# Patient Record
Sex: FEMALE | Race: WHITE | Employment: PART TIME | URBAN - METROPOLITAN AREA
[De-identification: names, ages, dates, MRNs, and addresses within clinical notes are randomized per-mention and may not be internally consistent; named-entity substitution may affect disease eponyms.]

---

## 2020-08-14 ENCOUNTER — HOSPITAL ENCOUNTER (OUTPATIENT)
Dept: PREADMISSION TESTING | Age: 70
Discharge: HOME | End: 2020-08-14
Payer: MEDICARE

## 2020-08-14 LAB
HCT VFR BLD AUTO: 33.5 % (ref 35–45)
HGB BLD-MCNC: 10.7 G/DL (ref 12–16)
POTASSIUM SERPL-SCNC: 5.4 MMOL/L (ref 3.5–5.5)

## 2020-08-14 PROCEDURE — 84132 ASSAY OF SERUM POTASSIUM: CPT

## 2020-08-14 PROCEDURE — 93005 ELECTROCARDIOGRAM TRACING: CPT

## 2020-08-14 PROCEDURE — 36415 COLL VENOUS BLD VENIPUNCTURE: CPT

## 2020-08-14 PROCEDURE — 85018 HEMOGLOBIN: CPT

## 2020-08-16 LAB
ATRIAL RATE: 52 BPM
CALCULATED P AXIS, ECG09: 63 DEGREES
CALCULATED R AXIS, ECG10: 38 DEGREES
CALCULATED T AXIS, ECG11: 54 DEGREES
DIAGNOSIS, 93000: NORMAL
P-R INTERVAL, ECG05: 178 MS
Q-T INTERVAL, ECG07: 462 MS
QRS DURATION, ECG06: 86 MS
QTC CALCULATION (BEZET), ECG08: 429 MS
VENTRICULAR RATE, ECG03: 52 BPM
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
## 2022-08-09 ENCOUNTER — OFFICE VISIT (OUTPATIENT)
Dept: NEUROLOGY | Age: 72
End: 2022-08-09
Payer: MEDICARE

## 2022-08-09 VITALS
RESPIRATION RATE: 16 BRPM | SYSTOLIC BLOOD PRESSURE: 140 MMHG | HEIGHT: 64 IN | BODY MASS INDEX: 31.41 KG/M2 | DIASTOLIC BLOOD PRESSURE: 80 MMHG | OXYGEN SATURATION: 98 % | WEIGHT: 184 LBS | TEMPERATURE: 97.3 F | HEART RATE: 100 BPM

## 2022-08-09 DIAGNOSIS — G31.84 MCI (MILD COGNITIVE IMPAIRMENT): ICD-10-CM

## 2022-08-09 DIAGNOSIS — S06.9X9S TRAUMATIC BRAIN INJURY WITH LOSS OF CONSCIOUSNESS, SEQUELA (HCC): Primary | ICD-10-CM

## 2022-08-09 DIAGNOSIS — I65.23 BILATERAL CAROTID ARTERY STENOSIS: ICD-10-CM

## 2022-08-09 PROCEDURE — 1123F ACP DISCUSS/DSCN MKR DOCD: CPT | Performed by: PSYCHIATRY & NEUROLOGY

## 2022-08-09 PROCEDURE — G8536 NO DOC ELDER MAL SCRN: HCPCS | Performed by: PSYCHIATRY & NEUROLOGY

## 2022-08-09 PROCEDURE — 3017F COLORECTAL CA SCREEN DOC REV: CPT | Performed by: PSYCHIATRY & NEUROLOGY

## 2022-08-09 PROCEDURE — 1090F PRES/ABSN URINE INCON ASSESS: CPT | Performed by: PSYCHIATRY & NEUROLOGY

## 2022-08-09 PROCEDURE — G8427 DOCREV CUR MEDS BY ELIG CLIN: HCPCS | Performed by: PSYCHIATRY & NEUROLOGY

## 2022-08-09 PROCEDURE — G8510 SCR DEP NEG, NO PLAN REQD: HCPCS | Performed by: PSYCHIATRY & NEUROLOGY

## 2022-08-09 PROCEDURE — 1101F PT FALLS ASSESS-DOCD LE1/YR: CPT | Performed by: PSYCHIATRY & NEUROLOGY

## 2022-08-09 PROCEDURE — 99205 OFFICE O/P NEW HI 60 MIN: CPT | Performed by: PSYCHIATRY & NEUROLOGY

## 2022-08-09 PROCEDURE — G8417 CALC BMI ABV UP PARAM F/U: HCPCS | Performed by: PSYCHIATRY & NEUROLOGY

## 2022-08-09 PROCEDURE — G8400 PT W/DXA NO RESULTS DOC: HCPCS | Performed by: PSYCHIATRY & NEUROLOGY

## 2022-08-09 RX ORDER — LEVOTHYROXINE SODIUM 88 UG/1
88 TABLET ORAL DAILY
COMMUNITY
Start: 2022-05-09

## 2022-08-09 RX ORDER — AMLODIPINE BESYLATE 2.5 MG/1
2.5 TABLET ORAL DAILY
COMMUNITY
Start: 2022-07-11

## 2022-08-09 RX ORDER — ALBUTEROL SULFATE 90 UG/1
AEROSOL, METERED RESPIRATORY (INHALATION)
COMMUNITY
Start: 2022-07-15

## 2022-08-09 RX ORDER — PANTOPRAZOLE SODIUM 40 MG/1
TABLET, DELAYED RELEASE ORAL
COMMUNITY
Start: 2022-06-30

## 2022-08-09 RX ORDER — ATORVASTATIN CALCIUM 20 MG/1
20 TABLET, FILM COATED ORAL DAILY
COMMUNITY
Start: 2022-06-29

## 2022-08-09 RX ORDER — LOSARTAN POTASSIUM 50 MG/1
50 TABLET ORAL DAILY
COMMUNITY
Start: 2022-07-03

## 2022-08-09 RX ORDER — CITALOPRAM 40 MG/1
40 TABLET, FILM COATED ORAL DAILY
COMMUNITY
Start: 2022-06-29

## 2022-08-09 RX ORDER — AMITRIPTYLINE HYDROCHLORIDE 10 MG/1
TABLET, FILM COATED ORAL
COMMUNITY
Start: 2022-06-30

## 2022-08-09 RX ORDER — BUTALBITAL, ACETAMINOPHEN AND CAFFEINE 300; 40; 50 MG/1; MG/1; MG/1
CAPSULE ORAL
COMMUNITY
Start: 2022-07-12
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
## 2022-09-01 ENCOUNTER — OFFICE VISIT (OUTPATIENT)
Dept: NEUROLOGY | Age: 72
End: 2022-09-01
Payer: MEDICARE

## 2022-09-01 DIAGNOSIS — F33.41 RECURRENT MAJOR DEPRESSIVE DISORDER, IN PARTIAL REMISSION (HCC): Primary | ICD-10-CM

## 2022-09-01 PROCEDURE — 1123F ACP DISCUSS/DSCN MKR DOCD: CPT | Performed by: CLINICAL NEUROPSYCHOLOGIST

## 2022-09-01 PROCEDURE — 90791 PSYCH DIAGNOSTIC EVALUATION: CPT | Performed by: CLINICAL NEUROPSYCHOLOGIST
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
## 2022-09-15 ENCOUNTER — OFFICE VISIT (OUTPATIENT)
Dept: NEUROLOGY | Age: 72
End: 2022-09-15
Payer: MEDICARE

## 2022-09-15 DIAGNOSIS — F33.41 MAJOR DEPRESSIVE DISORDER, RECURRENT EPISODE, IN PARTIAL REMISSION WITH ANXIOUS DISTRESS (HCC): ICD-10-CM

## 2022-09-15 DIAGNOSIS — S06.9X9S TRAUMATIC BRAIN INJURY WITH LOSS OF CONSCIOUSNESS, SEQUELA (HCC): Primary | ICD-10-CM

## 2022-09-15 DIAGNOSIS — G31.84 MILD COGNITIVE IMPAIRMENT: ICD-10-CM

## 2022-09-15 PROCEDURE — 96138 PSYCL/NRPSYC TECH 1ST: CPT | Performed by: CLINICAL NEUROPSYCHOLOGIST

## 2022-09-15 PROCEDURE — 96139 PSYCL/NRPSYC TST TECH EA: CPT | Performed by: CLINICAL NEUROPSYCHOLOGIST

## 2022-09-21 PROBLEM — S06.9X9A TRAUMATIC BRAIN INJURY WITH LOSS OF CONSCIOUSNESS (HCC): Status: ACTIVE | Noted: 2022-09-21

## 2022-09-21 PROBLEM — G31.84 MILD COGNITIVE IMPAIRMENT: Status: ACTIVE | Noted: 2022-09-21
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
## 2022-09-29 ENCOUNTER — OFFICE VISIT (OUTPATIENT)
Dept: NEUROLOGY | Age: 72
End: 2022-09-29
Payer: MEDICARE

## 2022-09-29 DIAGNOSIS — S06.9X9S TRAUMATIC BRAIN INJURY WITH LOSS OF CONSCIOUSNESS, SEQUELA (HCC): Primary | ICD-10-CM

## 2022-09-29 DIAGNOSIS — G31.84 MILD COGNITIVE IMPAIRMENT: ICD-10-CM

## 2022-09-29 PROCEDURE — 96133 NRPSYC TST EVAL PHYS/QHP EA: CPT | Performed by: CLINICAL NEUROPSYCHOLOGIST

## 2022-09-29 PROCEDURE — 96132 NRPSYC TST EVAL PHYS/QHP 1ST: CPT | Performed by: CLINICAL NEUROPSYCHOLOGIST

## 2022-09-29 NOTE — PROGRESS NOTES
Prior to seeing the patient I reviewed pertinent records, including the previously completed report, the records in [place], and any updated visits from other providers since the patient's last visit. Today, I engaged in a psychoeducational and supportive feedback session with the patient. I provided psychotherapy in the form of psychoeducation and support with respect to the results of the recent Neuropsychological Evaluation, including discussing individual tests as well as patient's areas of neurocognitive strength versus weakness. We discussed, in detail, the following:  Compared to the patient's baseline, testing revealed evidence of mild impairment across several domains of cognitive functioning. 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 These findings corroborate the patient's report of changes in her cognitive function that interfered with her occupational performance  Reviewed prognosis  Discussed recommendations outlined in report dated 9/15/2022    Education was provided regarding my diagnostic impressions, and we discussed treatment plan/options. I also answered numerous questions related to the clinical findings, including discussing various methods to improve cognition and mood. Supportive/Cognitive Behavioral/Solution Focused psychotherapy provided. The patient needs to:    Follow-up with referring provider for ongoing management  Developed compensatory strategies for her cognitive weaknesses  Incorporate modifiable protective behaviors into her routine (e.g., exercise, healthy diet, and cognitive stimulation)  Focus on improving sleep    TIME:  Clinical Interview: 1200 - 1230 = 30 minutes  Testing by technician: 8862 - 8109 = 129 minutes  Scoring by technician: 48 minutes  Neuropsychological testing evaluation services*: 213 minutes + 15 minutes feedback = 228 minutes total    BILLING:  59103 x 1 Unit  96138 x 1 Unit  96139 x 5 Units  96132 x 1 Units  96133 x 3 Units    *Neuropsychological testing evaluation services include: Integration of patient data, interpretation of standardized test results and clinical data, clinical decision-making, treatment planning and report, and interactive feedback to the patient, family member(s) or caregiver(s), when performed.

## 2022-12-09 ENCOUNTER — OFFICE VISIT (OUTPATIENT)
Dept: NEUROLOGY | Age: 72
End: 2022-12-09
Payer: MEDICARE

## 2022-12-09 VITALS
HEART RATE: 80 BPM | HEIGHT: 64 IN | RESPIRATION RATE: 16 BRPM | SYSTOLIC BLOOD PRESSURE: 150 MMHG | TEMPERATURE: 98.1 F | WEIGHT: 195.8 LBS | BODY MASS INDEX: 33.43 KG/M2 | OXYGEN SATURATION: 97 % | DIASTOLIC BLOOD PRESSURE: 80 MMHG

## 2022-12-09 DIAGNOSIS — S06.9X9S TRAUMATIC BRAIN INJURY WITH LOSS OF CONSCIOUSNESS, SEQUELA (HCC): ICD-10-CM

## 2022-12-09 DIAGNOSIS — G43.011 INTRACTABLE MIGRAINE WITHOUT AURA AND WITH STATUS MIGRAINOSUS: ICD-10-CM

## 2022-12-09 DIAGNOSIS — G31.84 MILD COGNITIVE IMPAIRMENT: Primary | ICD-10-CM

## 2022-12-09 RX ORDER — TOPIRAMATE 25 MG/1
25 TABLET ORAL 2 TIMES DAILY
Qty: 60 TABLET | Refills: 2 | Status: SHIPPED | OUTPATIENT
Start: 2022-12-09

## 2022-12-09 RX ORDER — RIZATRIPTAN BENZOATE 5 MG/1
TABLET ORAL
Qty: 9 TABLET | Refills: 1 | Status: SHIPPED | OUTPATIENT
Start: 2022-12-09

## 2022-12-09 RX ORDER — ONDANSETRON 4 MG/1
4 TABLET, ORALLY DISINTEGRATING ORAL
Qty: 10 TABLET | Refills: 1 | Status: SHIPPED | OUTPATIENT
Start: 2022-12-09 | End: 2022-12-09

## 2022-12-09 RX ORDER — ONDANSETRON 4 MG/1
4 TABLET, ORALLY DISINTEGRATING ORAL
Qty: 10 TABLET | Refills: 1 | Status: SHIPPED | OUTPATIENT
Start: 2022-12-09

## 2022-12-09 RX ORDER — FAMOTIDINE 40 MG/1
TABLET, FILM COATED ORAL
COMMUNITY
Start: 2022-09-26
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
## 2022-12-09 NOTE — PROGRESS NOTES
Chief Complaint   Patient presents with    Follow-up     Traumatic brain injury with loss of consciousness seen by Dr Shira Parsons and review Dr Dai Arias encounter, carotid doppler       1. Have you been to the ER, urgent care clinic since your last visit? No  Hospitalized since your last visit? No    2. Have you seen or consulted any other health care providers outside of the [address] [place] [address] since your last visit? No  Include any pap smears or colon screening.  No

## 2023-03-24 ENCOUNTER — OFFICE VISIT (OUTPATIENT)
Dept: NEUROLOGY | Age: 73
End: 2023-03-24

## 2023-03-24 VITALS
TEMPERATURE: 97.7 F | RESPIRATION RATE: 16 BRPM | BODY MASS INDEX: 31.41 KG/M2 | OXYGEN SATURATION: 96 % | SYSTOLIC BLOOD PRESSURE: 140 MMHG | WEIGHT: 184 LBS | DIASTOLIC BLOOD PRESSURE: 80 MMHG | HEART RATE: 66 BPM | HEIGHT: 64 IN

## 2023-03-24 DIAGNOSIS — G43.011 INTRACTABLE MIGRAINE WITHOUT AURA AND WITH STATUS MIGRAINOSUS: Primary | ICD-10-CM

## 2023-03-24 DIAGNOSIS — S06.9X9S TRAUMATIC BRAIN INJURY WITH LOSS OF CONSCIOUSNESS, SEQUELA (HCC): ICD-10-CM

## 2023-03-24 DIAGNOSIS — G31.84 MILD COGNITIVE IMPAIRMENT: ICD-10-CM

## 2023-03-24 DIAGNOSIS — F41.9 ANXIETY: ICD-10-CM

## 2023-03-24 RX ORDER — TOPIRAMATE 25 MG/1
25 TABLET ORAL 2 TIMES DAILY
Qty: 60 TABLET | Refills: 5 | Status: SHIPPED | OUTPATIENT
Start: 2023-03-24

## 2023-03-24 RX ORDER — RIZATRIPTAN BENZOATE 5 MG/1
TABLET ORAL
Qty: 9 TABLET | Refills: 5 | Status: SHIPPED | OUTPATIENT
Start: 2023-03-24

## 2023-03-24 RX ORDER — BUPROPION HYDROCHLORIDE 150 MG/1
150 TABLET ORAL
COMMUNITY
Start: 2023-03-14

## 2023-03-24 RX ORDER — NALTREXONE HYDROCHLORIDE 50 MG/1
50 TABLET, FILM COATED ORAL DAILY
COMMUNITY
Start: 2023-03-14

## 2023-03-24 NOTE — PROGRESS NOTES
Chief Complaint   Patient presents with    Follow-up     Traumatic brain injury with loss of consciousness Dr Kaleb Newby encounter         1. Have you been to the ER, urgent care clinic since your last visit? No Hospitalized since your last visit? No     2. Have you seen or consulted any other health care providers outside of the [address] [place] [address] since your last visit? Yes PCP & Cardiology Include any pap smears or colon screening.   Yes Mammogram     Patient had syncope episode while in bathroom due to stomach cramps got hot and sweating had to have her boyfriend help didn't seek medical attenuation
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
## 2023-05-19 RX ORDER — RIZATRIPTAN BENZOATE 5 MG/1
TABLET ORAL
COMMUNITY
Start: 2023-03-24 | End: 2023-06-27 | Stop reason: SDUPTHER

## 2023-05-19 RX ORDER — BUPROPION HYDROCHLORIDE 150 MG/1
150 TABLET ORAL
COMMUNITY
Start: 2023-03-14

## 2023-05-19 RX ORDER — LOSARTAN POTASSIUM 50 MG/1
50 TABLET ORAL DAILY
COMMUNITY
Start: 2022-07-03

## 2023-05-19 RX ORDER — PANTOPRAZOLE SODIUM 40 MG/1
TABLET, DELAYED RELEASE ORAL
COMMUNITY
Start: 2022-06-30 | End: 2023-06-27

## 2023-05-19 RX ORDER — AMLODIPINE BESYLATE 2.5 MG/1
2.5 TABLET ORAL DAILY
COMMUNITY
Start: 2022-07-11

## 2023-05-19 RX ORDER — ONDANSETRON 4 MG/1
4 TABLET, ORALLY DISINTEGRATING ORAL EVERY 8 HOURS PRN
COMMUNITY
Start: 2022-12-09

## 2023-05-19 RX ORDER — ATORVASTATIN CALCIUM 20 MG/1
20 TABLET, FILM COATED ORAL DAILY
COMMUNITY
Start: 2022-06-29

## 2023-05-19 RX ORDER — TOPIRAMATE 25 MG/1
25 TABLET ORAL 2 TIMES DAILY
COMMUNITY
Start: 2023-03-24

## 2023-05-19 RX ORDER — FAMOTIDINE 40 MG/1
TABLET, FILM COATED ORAL
COMMUNITY
Start: 2022-09-26 | End: 2023-06-27

## 2023-05-19 RX ORDER — CITALOPRAM 40 MG/1
40 TABLET ORAL DAILY
COMMUNITY
Start: 2022-06-29

## 2023-05-19 RX ORDER — NALTREXONE HYDROCHLORIDE 50 MG/1
50 TABLET, FILM COATED ORAL DAILY
COMMUNITY
Start: 2023-03-14 | End: 2023-06-27

## 2023-05-19 RX ORDER — LEVOTHYROXINE SODIUM 88 UG/1
88 TABLET ORAL DAILY
COMMUNITY
Start: 2022-05-09

## 2023-06-27 ENCOUNTER — OFFICE VISIT (OUTPATIENT)
Age: 73
End: 2023-06-27
Payer: MEDICARE

## 2023-06-27 VITALS
OXYGEN SATURATION: 97 % | DIASTOLIC BLOOD PRESSURE: 70 MMHG | HEIGHT: 64 IN | RESPIRATION RATE: 16 BRPM | BODY MASS INDEX: 31.34 KG/M2 | SYSTOLIC BLOOD PRESSURE: 128 MMHG | TEMPERATURE: 98.4 F | HEART RATE: 73 BPM | WEIGHT: 183.6 LBS

## 2023-06-27 DIAGNOSIS — G43.011 MIGRAINE WITHOUT AURA, INTRACTABLE, WITH STATUS MIGRAINOSUS: Primary | ICD-10-CM

## 2023-06-27 PROBLEM — E87.1 HYPONATREMIA: Status: ACTIVE | Noted: 2021-05-17

## 2023-06-27 PROBLEM — E03.9 HYPOTHYROIDISM: Status: ACTIVE | Noted: 2020-02-28

## 2023-06-27 PROBLEM — F32.A DEPRESSION: Status: ACTIVE | Noted: 2020-02-28

## 2023-06-27 PROBLEM — B19.20 HEPATITIS C VIRUS INFECTION WITHOUT HEPATIC COMA: Status: ACTIVE | Noted: 2020-02-28

## 2023-06-27 PROBLEM — E11.69 DM COMA, TYPE 2 (HCC): Status: ACTIVE | Noted: 2020-02-28

## 2023-06-27 PROBLEM — H52.01 HYPEROPIA WITH ASTIGMATISM AND PRESBYOPIA, RIGHT: Status: ACTIVE | Noted: 2022-12-07

## 2023-06-27 PROBLEM — R55 VASOVAGAL SYNCOPE: Status: ACTIVE | Noted: 2021-05-19

## 2023-06-27 PROBLEM — S00.03XA TRAUMATIC HEMATOMA OF SCALP: Status: ACTIVE | Noted: 2020-12-28

## 2023-06-27 PROBLEM — G47.33 OSA (OBSTRUCTIVE SLEEP APNEA): Status: ACTIVE | Noted: 2020-02-28

## 2023-06-27 PROBLEM — H52.4 HYPEROPIA WITH ASTIGMATISM AND PRESBYOPIA, RIGHT: Status: ACTIVE | Noted: 2022-12-07

## 2023-06-27 PROBLEM — S22.089A T11 VERTEBRAL FRACTURE (HCC): Status: ACTIVE | Noted: 2020-12-28

## 2023-06-27 PROBLEM — H52.02 HYPEROPIA WITH ASTIGMATISM AND PRESBYOPIA, LEFT: Status: ACTIVE | Noted: 2022-12-07

## 2023-06-27 PROBLEM — R55 VASOVAGAL SYNCOPE: Status: RESOLVED | Noted: 2021-05-19 | Resolved: 2023-06-27

## 2023-06-27 PROBLEM — H52.4 HYPEROPIA WITH ASTIGMATISM AND PRESBYOPIA, LEFT: Status: ACTIVE | Noted: 2022-12-07

## 2023-06-27 PROBLEM — S06.5XAA SDH (SUBDURAL HEMATOMA) (HCC): Status: RESOLVED | Noted: 2020-12-28 | Resolved: 2023-06-27

## 2023-06-27 PROBLEM — H52.201 HYPEROPIA WITH ASTIGMATISM AND PRESBYOPIA, RIGHT: Status: ACTIVE | Noted: 2022-12-07

## 2023-06-27 PROBLEM — E11.69 DM COMA, TYPE 2 (HCC): Status: RESOLVED | Noted: 2020-02-28 | Resolved: 2023-06-27

## 2023-06-27 PROBLEM — I10 BENIGN HYPERTENSION: Status: ACTIVE | Noted: 2020-02-28

## 2023-06-27 PROBLEM — S06.5XAA SDH (SUBDURAL HEMATOMA) (HCC): Status: ACTIVE | Noted: 2020-12-28

## 2023-06-27 PROBLEM — Z87.820 PERSONAL HISTORY OF TRAUMATIC BRAIN INJURY: Status: ACTIVE | Noted: 2023-06-27

## 2023-06-27 PROBLEM — S00.03XA TRAUMATIC HEMATOMA OF SCALP: Status: RESOLVED | Noted: 2020-12-28 | Resolved: 2023-06-27

## 2023-06-27 PROBLEM — H52.202 HYPEROPIA WITH ASTIGMATISM AND PRESBYOPIA, LEFT: Status: ACTIVE | Noted: 2022-12-07

## 2023-06-27 PROBLEM — S06.9X9A TRAUMATIC BRAIN INJURY WITH LOSS OF CONSCIOUSNESS (HCC): Status: RESOLVED | Noted: 2022-09-21 | Resolved: 2023-06-27

## 2023-06-27 PROBLEM — S22.089A T11 VERTEBRAL FRACTURE (HCC): Status: RESOLVED | Noted: 2020-12-28 | Resolved: 2023-06-27

## 2023-06-27 PROBLEM — E87.1 HYPONATREMIA: Status: RESOLVED | Noted: 2021-05-17 | Resolved: 2023-06-27

## 2023-06-27 PROCEDURE — 3078F DIAST BP <80 MM HG: CPT | Performed by: PSYCHIATRY & NEUROLOGY

## 2023-06-27 PROCEDURE — 99215 OFFICE O/P EST HI 40 MIN: CPT | Performed by: PSYCHIATRY & NEUROLOGY

## 2023-06-27 PROCEDURE — 3074F SYST BP LT 130 MM HG: CPT | Performed by: PSYCHIATRY & NEUROLOGY

## 2023-06-27 PROCEDURE — 1123F ACP DISCUSS/DSCN MKR DOCD: CPT | Performed by: PSYCHIATRY & NEUROLOGY

## 2023-06-27 PROCEDURE — 96372 THER/PROPH/DIAG INJ SC/IM: CPT | Performed by: PSYCHIATRY & NEUROLOGY

## 2023-06-27 RX ORDER — OMEPRAZOLE 40 MG/1
CAPSULE, DELAYED RELEASE ORAL
COMMUNITY
Start: 2023-06-17

## 2023-06-27 RX ORDER — GABAPENTIN 100 MG/1
CAPSULE ORAL
Qty: 120 CAPSULE | Refills: 2 | Status: SHIPPED | OUTPATIENT
Start: 2023-06-27 | End: 2023-09-25

## 2023-06-27 RX ORDER — RIZATRIPTAN BENZOATE 5 MG/1
TABLET ORAL
Qty: 9 TABLET | Refills: 5 | Status: SHIPPED | OUTPATIENT
Start: 2023-06-27

## 2023-06-27 ASSESSMENT — PATIENT HEALTH QUESTIONNAIRE - PHQ9
2. FEELING DOWN, DEPRESSED OR HOPELESS: 0
1. LITTLE INTEREST OR PLEASURE IN DOING THINGS: 0
SUM OF ALL RESPONSES TO PHQ QUESTIONS 1-9: 0
SUM OF ALL RESPONSES TO PHQ QUESTIONS 1-9: 0
SUM OF ALL RESPONSES TO PHQ9 QUESTIONS 1 & 2: 0
SUM OF ALL RESPONSES TO PHQ QUESTIONS 1-9: 0
SUM OF ALL RESPONSES TO PHQ QUESTIONS 1-9: 0

## 2023-10-24 DIAGNOSIS — G43.701 CHRONIC MIGRAINE WITHOUT AURA WITH STATUS MIGRAINOSUS, NOT INTRACTABLE: Primary | ICD-10-CM

## 2023-10-24 RX ORDER — METHYLPREDNISOLONE 4 MG/1
TABLET ORAL
Qty: 1 KIT | Refills: 1 | Status: SHIPPED | OUTPATIENT
Start: 2023-10-24

## 2023-10-27 ENCOUNTER — TELEPHONE (OUTPATIENT)
Age: 73
End: 2023-10-27

## 2023-10-27 ENCOUNTER — PATIENT MESSAGE (OUTPATIENT)
Age: 73
End: 2023-10-27

## 2023-10-27 DIAGNOSIS — G43.011 MIGRAINE WITHOUT AURA, INTRACTABLE, WITH STATUS MIGRAINOSUS: ICD-10-CM

## 2023-10-27 RX ORDER — GABAPENTIN 100 MG/1
CAPSULE ORAL
Qty: 180 CAPSULE | Refills: 2 | Status: SHIPPED | OUTPATIENT
Start: 2023-10-27 | End: 2024-01-25

## 2023-10-27 NOTE — TELEPHONE ENCOUNTER
Verified patient with 2 identifiers   Ad[facility]

## 2023-10-27 NOTE — TELEPHONE ENCOUNTER
Patient would like a call back to discuss obtaining a refill of gabapentin. Patient also would like to discuss what happens if she cannot get it refilled as she is about to run out. Please call back and advise. PSR is routing this directly to the NP as he does not know who the NP's nurse is.

## 2023-10-27 NOTE — TELEPHONE ENCOUNTER
From: Marielos Pang  To: Susan Vanessa  Sent: 10/27/2023 10:07 AM EDT  Subject: Need gabapentin refil asap    I am out of refills of gabapentin! Pretty scary, as headaches have been out out of control. Currently, was taking 600 mg a day. Ash Barlow called in prednisone this week and I am on day three.

## 2023-12-01 ENCOUNTER — OFFICE VISIT (OUTPATIENT)
Age: 73
End: 2023-12-01

## 2023-12-01 VITALS
WEIGHT: 183 LBS | OXYGEN SATURATION: 98 % | TEMPERATURE: 97.6 F | DIASTOLIC BLOOD PRESSURE: 70 MMHG | HEART RATE: 78 BPM | SYSTOLIC BLOOD PRESSURE: 122 MMHG | BODY MASS INDEX: 31.24 KG/M2 | RESPIRATION RATE: 18 BRPM | HEIGHT: 64 IN

## 2023-12-01 DIAGNOSIS — S06.9X9S TRAUMATIC BRAIN INJURY WITH LOSS OF CONSCIOUSNESS, SEQUELA (HCC): ICD-10-CM

## 2023-12-01 DIAGNOSIS — G31.84 MILD COGNITIVE IMPAIRMENT OF UNCERTAIN OR UNKNOWN ETIOLOGY: ICD-10-CM

## 2023-12-01 DIAGNOSIS — F41.8 DEPRESSION WITH ANXIETY: ICD-10-CM

## 2023-12-01 DIAGNOSIS — G43.011 MIGRAINE WITHOUT AURA, INTRACTABLE, WITH STATUS MIGRAINOSUS: Primary | ICD-10-CM

## 2023-12-01 ASSESSMENT — PATIENT HEALTH QUESTIONNAIRE - PHQ9
1. LITTLE INTEREST OR PLEASURE IN DOING THINGS: 1
SUM OF ALL RESPONSES TO PHQ QUESTIONS 1-9: 2
SUM OF ALL RESPONSES TO PHQ QUESTIONS 1-9: 2
2. FEELING DOWN, DEPRESSED OR HOPELESS: 1
SUM OF ALL RESPONSES TO PHQ9 QUESTIONS 1 & 2: 2
SUM OF ALL RESPONSES TO PHQ QUESTIONS 1-9: 2
SUM OF ALL RESPONSES TO PHQ QUESTIONS 1-9: 2

## 2023-12-01 ASSESSMENT — ENCOUNTER SYMPTOMS
VOMITING: 0
NAUSEA: 1
PHOTOPHOBIA: 0

## 2023-12-01 NOTE — PROGRESS NOTES
[facility]  [address] [place] [address]  Tel: [phone]  Fax: [phone]      Date:  23     Name:  Shraddha Vásquez  :  1950  MRN:  [id number]     PCP:  JESSICA Aldrich NP    Chief Complaint   Patient presents with    Migraine     Follow up - ok sometimes- other times not so good - doing dry needling every other week and the meds        HISTORY OF PRESENT ILLNESS:  Patient presents today for regular follow up, last seen with Ld Doshi 2023 for migraine headaches. Dry needling seems to be helping a lot, goes every other week, at [facility]. She notes with the medications she has good control of her headaches and migraines, her biggest concern is the potential side effects of medication and she does note some impairment in her memory at times. This is really bothersome to the patient. Migraine headaches:  Frequency: 6-8/30 days. Location: back of the neck and it wraps to the front. Characteristics: pressure and pain, radiating pain. C/o nausea, no vomiting. No light or sound sensitivity. Relief: laying down,  Gabapentin and Topamax. Massage helps mildly, dry needling has been helpful. Tried the Rizatriptan: unsure of the relief, hard to tell a difference. Hand right hand surgery this past summer, since then she has decreased her activity and exercise. Sleep is good, maybe sleeping too much   Good diet. No ETOH. No smoking. Gabapentin: tried to wean off of it, she was worried about taking it, but she is back to taking 200mg BID, without it the headaches were over the top. Topamax 25mg BID: she had tried to wean off, seems to be helping. She notes some memory issues, hard time finding her words, more frequently than not. She has had depression for a long time, she is on meds for it, but in the past 5-6 months, it feels a little different. Hard time getting motivated.   Patient did complete neuropsych testing with

## 2023-12-01 NOTE — PROGRESS NOTES
Chief Complaint   Patient presents with    Migraine     Follow up - ok sometimes- other times not so good - doing dry needling every other week and the meds      1. Have you been to the ER, urgent care clinic since your last visit? Hospitalized since your last visit? No     2. Have you seen or consulted any other health care providers outside of the [address] [place] [address] Avenue since your last visit? Include any pap smears or colon screening.   Yes [facility] for right hand surgery                        Dr Rothman for Lipoma removal

## 2024-01-04 ENCOUNTER — CLINICAL DOCUMENTATION (OUTPATIENT)
Age: 74
End: 2024-01-04

## 2024-01-04 NOTE — PROGRESS NOTES
Received fax from [facility] that was illegible. Requested them send another fax to [phone]. Representative stated she would re fax.

## 2024-03-08 DIAGNOSIS — G43.011 MIGRAINE WITHOUT AURA, INTRACTABLE, WITH STATUS MIGRAINOSUS: ICD-10-CM

## 2024-03-08 RX ORDER — GABAPENTIN 100 MG/1
CAPSULE ORAL
Qty: 180 CAPSULE | Refills: 2 | Status: SHIPPED | OUTPATIENT
Start: 2024-03-08 | End: 2024-06-06

## 2024-03-08 NOTE — TELEPHONE ENCOUNTER
Received fax from [facility] requesting refill on Gabapentin. You did authorize on paper fax however fax will not go through

## 2024-03-20 ENCOUNTER — OFFICE VISIT (OUTPATIENT)
Age: 74
End: 2024-03-20
Payer: MEDICARE

## 2024-03-20 DIAGNOSIS — G31.84 MILD COGNITIVE IMPAIRMENT OF UNCERTAIN OR UNKNOWN ETIOLOGY: ICD-10-CM

## 2024-03-20 DIAGNOSIS — S06.9X9S TRAUMATIC BRAIN INJURY WITH LOSS OF CONSCIOUSNESS, SEQUELA (HCC): ICD-10-CM

## 2024-03-20 DIAGNOSIS — F33.41 MAJOR DEPRESSIVE DISORDER, RECURRENT, IN PARTIAL REMISSION (HCC): Primary | ICD-10-CM

## 2024-03-20 PROCEDURE — 1036F TOBACCO NON-USER: CPT | Performed by: CLINICAL NEUROPSYCHOLOGIST

## 2024-03-20 PROCEDURE — 90791 PSYCH DIAGNOSTIC EVALUATION: CPT | Performed by: CLINICAL NEUROPSYCHOLOGIST

## 2024-03-20 PROCEDURE — 1123F ACP DISCUSS/DSCN MKR DOCD: CPT | Performed by: CLINICAL NEUROPSYCHOLOGIST

## 2024-03-20 NOTE — PROGRESS NOTES
before retiring in 2014.  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   Service: None  Relationship Status: In a \"good\" relationship with her \"boyfriend.\"  Children: 2 grown children, living in [place]  Housing: Private residence with boyfriend      Substance Use:  Alcohol: Reported history of alcohol abuse.  She largely stopped drinking approximately 3 years ago but reported she consumed some alcohol since the previous evaluation but has again stopped.  She reported excessive alcohol consumption for approximately  2 to 3 years.  Patient stated she primarily consumed beer and estimated her daily level of use to be \"3-4 beers\" per day.  Patient previously participated in outpatient treatment.  Does not participate in community support groups  Nicotine: Denied  Recreational/Illicit Substances: Denied    BEHAVIORAL OBSERVATIONS:  Appearance: Casually dressed, Well-groomed, and Appeared stated age  Orientation: Person, Place, Time, and Situation  Motor: Ambulated independently, Adequate gait, Adequate posture, No involuntary movements, and Adequate strength  Thought Processes: Clear, Coherent, Logical, and Organized  Hearing and Vision: Adequate  Speech: Appropriate for Rate, Tone, Prosody, and Volume  Comprehension: Adequate  Interpersonal Skills: Adequate  Affect: Appropriate  Ability as Historian: Adequate  Insight: Adequate  Judgment: Adequate    STRENGTHS:  Exercising self-direction/Resourceful, Access to housing/residential stability, and Interpersonal/supportive relationships (family, friends, peers)    WEAKNESSES:  Health problems    IMPRESSION:  The patient is a pleasant 73-year-old female who presents for serial neuropsychological consultation due to ongoing concerns regarding her cognitive functioning.  As these concerns do not appear to be evidence of a progressive change and they are isolated events, I suspect they are more related to normal aging

## 2024-04-08 RX ORDER — TOPIRAMATE 25 MG/1
25 TABLET ORAL 2 TIMES DAILY
Qty: 180 TABLET | OUTPATIENT
Start: 2024-04-08

## 2024-04-08 RX ORDER — TOPIRAMATE 25 MG/1
25 TABLET ORAL 2 TIMES DAILY
Qty: 60 TABLET | Refills: 1 | Status: SHIPPED | OUTPATIENT
Start: 2024-04-08

## 2024-05-10 RX ORDER — TOPIRAMATE 25 MG/1
25 TABLET ORAL 2 TIMES DAILY
Qty: 60 TABLET | Refills: 1 | Status: SHIPPED | OUTPATIENT
Start: 2024-05-10

## 2024-05-14 ENCOUNTER — OFFICE VISIT (OUTPATIENT)
Age: 74
End: 2024-05-14
Payer: MEDICARE

## 2024-05-14 VITALS
RESPIRATION RATE: 18 BRPM | SYSTOLIC BLOOD PRESSURE: 142 MMHG | OXYGEN SATURATION: 98 % | HEART RATE: 87 BPM | HEIGHT: 64 IN | DIASTOLIC BLOOD PRESSURE: 70 MMHG | BODY MASS INDEX: 31.96 KG/M2 | WEIGHT: 187.2 LBS

## 2024-05-14 DIAGNOSIS — F33.41 MAJOR DEPRESSIVE DISORDER, RECURRENT, IN PARTIAL REMISSION (HCC): ICD-10-CM

## 2024-05-14 DIAGNOSIS — G43.011 MIGRAINE WITHOUT AURA, INTRACTABLE, WITH STATUS MIGRAINOSUS: Primary | ICD-10-CM

## 2024-05-14 DIAGNOSIS — R55 PRE-SYNCOPE: ICD-10-CM

## 2024-05-14 DIAGNOSIS — Z87.820 HISTORY OF TRAUMATIC BRAIN INJURY: ICD-10-CM

## 2024-05-14 PROCEDURE — 1036F TOBACCO NON-USER: CPT | Performed by: NURSE PRACTITIONER

## 2024-05-14 PROCEDURE — 1123F ACP DISCUSS/DSCN MKR DOCD: CPT | Performed by: NURSE PRACTITIONER

## 2024-05-14 PROCEDURE — 3017F COLORECTAL CA SCREEN DOC REV: CPT | Performed by: NURSE PRACTITIONER

## 2024-05-14 PROCEDURE — G8427 DOCREV CUR MEDS BY ELIG CLIN: HCPCS | Performed by: NURSE PRACTITIONER

## 2024-05-14 PROCEDURE — 3078F DIAST BP <80 MM HG: CPT | Performed by: NURSE PRACTITIONER

## 2024-05-14 PROCEDURE — 99213 OFFICE O/P EST LOW 20 MIN: CPT | Performed by: NURSE PRACTITIONER

## 2024-05-14 PROCEDURE — G8400 PT W/DXA NO RESULTS DOC: HCPCS | Performed by: NURSE PRACTITIONER

## 2024-05-14 PROCEDURE — 1090F PRES/ABSN URINE INCON ASSESS: CPT | Performed by: NURSE PRACTITIONER

## 2024-05-14 PROCEDURE — G8417 CALC BMI ABV UP PARAM F/U: HCPCS | Performed by: NURSE PRACTITIONER

## 2024-05-14 PROCEDURE — 3077F SYST BP >= 140 MM HG: CPT | Performed by: NURSE PRACTITIONER

## 2024-05-14 ASSESSMENT — PATIENT HEALTH QUESTIONNAIRE - PHQ9
SUM OF ALL RESPONSES TO PHQ QUESTIONS 1-9: 2
SUM OF ALL RESPONSES TO PHQ QUESTIONS 1-9: 0
SUM OF ALL RESPONSES TO PHQ QUESTIONS 1-9: 0
1. LITTLE INTEREST OR PLEASURE IN DOING THINGS: SEVERAL DAYS
SUM OF ALL RESPONSES TO PHQ9 QUESTIONS 1 & 2: 2
SUM OF ALL RESPONSES TO PHQ9 QUESTIONS 1 & 2: 0
SUM OF ALL RESPONSES TO PHQ QUESTIONS 1-9: 2
1. LITTLE INTEREST OR PLEASURE IN DOING THINGS: NOT AT ALL
2. FEELING DOWN, DEPRESSED OR HOPELESS: NOT AT ALL
SUM OF ALL RESPONSES TO PHQ QUESTIONS 1-9: 2
SUM OF ALL RESPONSES TO PHQ QUESTIONS 1-9: 0
2. FEELING DOWN, DEPRESSED OR HOPELESS: SEVERAL DAYS
SUM OF ALL RESPONSES TO PHQ QUESTIONS 1-9: 0
SUM OF ALL RESPONSES TO PHQ QUESTIONS 1-9: 2

## 2024-05-14 ASSESSMENT — ENCOUNTER SYMPTOMS: DIARRHEA: 1

## 2024-05-14 NOTE — PROGRESS NOTES
Chief Complaint   Patient presents with    Migraine     Doing well - meds are helping      1. Have you been to the ER, urgent care clinic since your last visit?  Hospitalized since your last visit? No     2. Have you seen or consulted any other health care providers outside of the [facility] since your last visit?  Include any pap smears or colon screening.  Yes Dr Hernandez - ENT                             PCP    
release capsule TAKE 1 CAPSULE BY MOUTH ONCE DAILY BEFORE A MEAL    rizatriptan (MAXALT) 5 MG tablet Take 1 tablet at onset of migraine/headache, may repeat after 2 hours    amLODIPine (NORVASC) 2.5 MG tablet Take 1 tablet by mouth daily    atorvastatin (LIPITOR) 20 MG tablet Take 1 tablet by mouth daily    buPROPion (WELLBUTRIN XL) 150 MG extended release tablet Take 1 tablet by mouth    citalopram (CELEXA) 40 MG tablet Take 1 tablet by mouth daily    levothyroxine (SYNTHROID) 88 MCG tablet Take 1 tablet by mouth daily    losartan (COZAAR) 50 MG tablet Take 1 tablet by mouth daily     No current facility-administered medications for this visit.     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    Hyperlipemia     Hyponatremia 5/17/2021    Primary hypertension     SDH (subdural hematoma) ([facility]) 12/28/2020    Syncope     T11 vertebral fracture ([facility]) 12/28/2020    Thyroid condition     Traumatic brain injury with loss of consciousness ([facility]) 9/21/2022    Traumatic hematoma of scalp 12/28/2020    Vasovagal syncope 5/19/2021      Past Surgical History:   Procedure Laterality Date    ANKLE FRACTURE SURGERY Left     pin removed 2021    BARIATRIC SURGERY      CHOLECYSTECTOMY      HYSTERECTOMY (CERVIX STATUS UNKNOWN)      ROTATOR CUFF REPAIR      X3  right/left     Past Surgical History:   Procedure Laterality Date    ANKLE FRACTURE SURGERY Left     pin removed 2021    BARIATRIC SURGERY      CHOLECYSTECTOMY      HYSTERECTOMY (CERVIX STATUS UNKNOWN)      ROTATOR CUFF REPAIR      X3  right/left      History reviewed. No pertinent family history.       PHYSICAL EXAMINATION:    Vitals:    05/14/24 1101 05/14/24 1105   BP: (!) 142/70 (!) 142/70   Site: Left Upper Arm Left Upper Arm   Position: Sitting Sitting   Cuff Size: Large Adult Large

## 2024-10-11 DIAGNOSIS — G43.011 MIGRAINE WITHOUT AURA, INTRACTABLE, WITH STATUS MIGRAINOSUS: ICD-10-CM

## 2024-10-14 RX ORDER — GABAPENTIN 100 MG/1
CAPSULE ORAL
Qty: 180 CAPSULE | Refills: 3 | Status: SHIPPED | OUTPATIENT
Start: 2024-10-14 | End: 2025-02-03

## 2024-10-25 RX ORDER — TOPIRAMATE 25 MG/1
25 TABLET, FILM COATED ORAL 2 TIMES DAILY
Qty: 60 TABLET | Refills: 5 | Status: SHIPPED | OUTPATIENT
Start: 2024-10-25

## 2025-01-19 ENCOUNTER — HOSPITAL ENCOUNTER (OUTPATIENT)
Facility: HOSPITAL | Age: 75
Discharge: HOME | End: 2025-01-22
Payer: MEDICARE

## 2025-01-19 DIAGNOSIS — M76.01 GLUTEAL TENDINITIS OF RIGHT BUTTOCK: ICD-10-CM

## 2025-01-19 DIAGNOSIS — M51.360 DISCOGENIC SYNDROME, LUMBAR: ICD-10-CM

## 2025-01-19 DIAGNOSIS — M51.34 DEGENERATION OF THORACIC INTERVERTEBRAL DISC: ICD-10-CM

## 2025-01-19 DIAGNOSIS — M47.816 LUMBAR SPONDYLOSIS: ICD-10-CM

## 2025-01-19 DIAGNOSIS — M47.814 THORACIC SPONDYLOSIS: ICD-10-CM

## 2025-01-19 PROCEDURE — 72146 MRI CHEST SPINE W/O DYE: CPT

## 2025-01-19 PROCEDURE — 72148 MRI LUMBAR SPINE W/O DYE: CPT

## 2025-02-03 ENCOUNTER — TELEMEDICINE (OUTPATIENT)
Age: 75
End: 2025-02-03
Payer: MEDICARE

## 2025-02-03 DIAGNOSIS — G43.011 MIGRAINE WITHOUT AURA, INTRACTABLE, WITH STATUS MIGRAINOSUS: Primary | ICD-10-CM

## 2025-02-03 DIAGNOSIS — F33.41 MAJOR DEPRESSIVE DISORDER, RECURRENT, IN PARTIAL REMISSION (HCC): ICD-10-CM

## 2025-02-03 DIAGNOSIS — Z87.820 HISTORY OF TRAUMATIC BRAIN INJURY: ICD-10-CM

## 2025-02-03 DIAGNOSIS — Z86.73 HISTORY OF STROKE: ICD-10-CM

## 2025-02-03 PROCEDURE — G8400 PT W/DXA NO RESULTS DOC: HCPCS | Performed by: NURSE PRACTITIONER

## 2025-02-03 PROCEDURE — G8427 DOCREV CUR MEDS BY ELIG CLIN: HCPCS | Performed by: NURSE PRACTITIONER

## 2025-02-03 PROCEDURE — 1036F TOBACCO NON-USER: CPT | Performed by: NURSE PRACTITIONER

## 2025-02-03 PROCEDURE — 99214 OFFICE O/P EST MOD 30 MIN: CPT | Performed by: NURSE PRACTITIONER

## 2025-02-03 PROCEDURE — G8417 CALC BMI ABV UP PARAM F/U: HCPCS | Performed by: NURSE PRACTITIONER

## 2025-02-03 PROCEDURE — 1090F PRES/ABSN URINE INCON ASSESS: CPT | Performed by: NURSE PRACTITIONER

## 2025-02-03 PROCEDURE — 1123F ACP DISCUSS/DSCN MKR DOCD: CPT | Performed by: NURSE PRACTITIONER

## 2025-02-03 PROCEDURE — 3017F COLORECTAL CA SCREEN DOC REV: CPT | Performed by: NURSE PRACTITIONER

## 2025-02-03 PROCEDURE — 1159F MED LIST DOCD IN RCRD: CPT | Performed by: NURSE PRACTITIONER

## 2025-02-03 PROCEDURE — 1160F RVW MEDS BY RX/DR IN RCRD: CPT | Performed by: NURSE PRACTITIONER

## 2025-02-03 ASSESSMENT — PATIENT HEALTH QUESTIONNAIRE - PHQ9
SUM OF ALL RESPONSES TO PHQ QUESTIONS 1-9: 0
SUM OF ALL RESPONSES TO PHQ QUESTIONS 1-9: 0
1. LITTLE INTEREST OR PLEASURE IN DOING THINGS: NOT AT ALL
2. FEELING DOWN, DEPRESSED OR HOPELESS: NOT AT ALL
SUM OF ALL RESPONSES TO PHQ QUESTIONS 1-9: 0
SUM OF ALL RESPONSES TO PHQ QUESTIONS 1-9: 0
SUM OF ALL RESPONSES TO PHQ9 QUESTIONS 1 & 2: 0

## 2025-02-03 NOTE — PROGRESS NOTES
Chief Complaint   Patient presents with    Migraine     Had only one January - meds working well      1. Have you been to the ER, urgent care clinic since your last visit?  Hospitalized since your last visit? Yes [facility] in [place] for chest pain - all testing negative     2. Have you seen or consulted any other health care providers outside of the [facility] since your last visit?  Include any pap smears or colon screening. Yes see above

## 2025-02-03 NOTE — PROGRESS NOTES
Neurology Note    Patient ID:  Ingrid Lopez  [id number]  74 y.o.  1950      Date of Consultation:  February 3, 2025        Reason for Consultation: Migraine headaches    This is a telemedicine visit that was performed with in the originating site at patient's home and the distance site at [facility].  This telemedicine visit utilized synchronous (real-time) audio-video technology.  Verbal consent to participate in the video visit was obtained.  This visit occurred during the corona (COVID -19) public health emergency.  I discussed with the patient the nature of our telemedicine visit, that:  - I would evaluate the patient and recommend diagnostic and treatment based on my assessment  - Our sessions are not being recorded and that personal health information is protected  - Our team will provide follow-up care in person if and when the patient needs it.      Ingrid Lopez, was evaluated through a synchronous (real-time) audio-video encounter. The patient (or guardian if applicable) is aware that this is a billable service, which includes applicable co-pays. This Virtual Visit was conducted with patient's (and/or legal guardian's) consent. Patient identification was verified, and a caregiver was present when appropriate.   The patient was located at Other: her parked car in [place]  Provider was located at Facility (Appt Dept): [address] [place] [address]  Confirm you are appropriately licensed, registered, or certified to deliver care in the state where the patient is located as indicated above. If you are not or unsure, please re-schedule the visit: Yes, I confirm.      Total time spent for this encounter: Not billed by time    --alpesh Hernandez APRN - NP on 2/3/2025 at 10:04 AM    An electronic signature was used to authenticate this note.   Consent:  The patient is aware that this patient-initiated Telehealth encounter is a

## 2025-05-02 ENCOUNTER — ANESTHESIA EVENT (OUTPATIENT)
Facility: HOSPITAL | Age: 75
End: 2025-05-02
Payer: MEDICARE

## 2025-05-02 ENCOUNTER — HOSPITAL ENCOUNTER (OUTPATIENT)
Facility: HOSPITAL | Age: 75
Setting detail: OUTPATIENT SURGERY
Discharge: HOME | End: 2025-05-02
Attending: INTERNAL MEDICINE | Admitting: INTERNAL MEDICINE
Payer: MEDICARE

## 2025-05-02 ENCOUNTER — ANESTHESIA (OUTPATIENT)
Facility: HOSPITAL | Age: 75
End: 2025-05-02
Payer: MEDICARE

## 2025-05-02 VITALS
WEIGHT: 182.5 LBS | TEMPERATURE: 98 F | BODY MASS INDEX: 31.16 KG/M2 | RESPIRATION RATE: 16 BRPM | SYSTOLIC BLOOD PRESSURE: 124 MMHG | HEIGHT: 64 IN | OXYGEN SATURATION: 97 % | DIASTOLIC BLOOD PRESSURE: 65 MMHG | HEART RATE: 62 BPM

## 2025-05-02 LAB — GLUCOSE BLD STRIP.AUTO-MCNC: 110 MG/DL (ref 70–110)

## 2025-05-02 PROCEDURE — 3600007502: Performed by: INTERNAL MEDICINE

## 2025-05-02 PROCEDURE — 2580000003 HC RX 258: Performed by: INTERNAL MEDICINE

## 2025-05-02 PROCEDURE — 6360000002 HC RX W HCPCS: Performed by: REGISTERED NURSE

## 2025-05-02 PROCEDURE — 2709999900 HC NON-CHARGEABLE SUPPLY: Performed by: INTERNAL MEDICINE

## 2025-05-02 PROCEDURE — 82962 GLUCOSE BLOOD TEST: CPT

## 2025-05-02 PROCEDURE — 7100000011 HC PHASE II RECOVERY - ADDTL 15 MIN: Performed by: INTERNAL MEDICINE

## 2025-05-02 PROCEDURE — 3700000000 HC ANESTHESIA ATTENDED CARE: Performed by: INTERNAL MEDICINE

## 2025-05-02 PROCEDURE — 7100000010 HC PHASE II RECOVERY - FIRST 15 MIN: Performed by: INTERNAL MEDICINE

## 2025-05-02 RX ORDER — PROPOFOL 10 MG/ML
INJECTION, EMULSION INTRAVENOUS
Status: DISCONTINUED | OUTPATIENT
Start: 2025-05-02 | End: 2025-05-02 | Stop reason: SDUPTHER

## 2025-05-02 RX ORDER — LIDOCAINE HYDROCHLORIDE 20 MG/ML
INJECTION, SOLUTION EPIDURAL; INFILTRATION; INTRACAUDAL; PERINEURAL
Status: DISCONTINUED | OUTPATIENT
Start: 2025-05-02 | End: 2025-05-02 | Stop reason: SDUPTHER

## 2025-05-02 RX ORDER — SODIUM CHLORIDE 9 MG/ML
INJECTION, SOLUTION INTRAVENOUS CONTINUOUS
Status: DISCONTINUED | OUTPATIENT
Start: 2025-05-02 | End: 2025-05-02 | Stop reason: HOSPADM

## 2025-05-02 RX ORDER — SODIUM CHLORIDE 9 MG/ML
INJECTION, SOLUTION INTRAVENOUS CONTINUOUS
Status: CANCELLED | OUTPATIENT
Start: 2025-05-02

## 2025-05-02 RX ADMIN — SODIUM CHLORIDE: 0.9 INJECTION, SOLUTION INTRAVENOUS at 09:21

## 2025-05-02 RX ADMIN — PROPOFOL 40 MG: 10 INJECTION, EMULSION INTRAVENOUS at 10:25

## 2025-05-02 RX ADMIN — PROPOFOL 30 MG: 10 INJECTION, EMULSION INTRAVENOUS at 10:24

## 2025-05-02 RX ADMIN — LIDOCAINE HYDROCHLORIDE 40 MG: 20 INJECTION, SOLUTION EPIDURAL; INFILTRATION; INTRACAUDAL; PERINEURAL at 10:19

## 2025-05-02 RX ADMIN — PROPOFOL 50 MG: 10 INJECTION, EMULSION INTRAVENOUS at 10:22

## 2025-05-02 RX ADMIN — PROPOFOL 40 MG: 10 INJECTION, EMULSION INTRAVENOUS at 10:23

## 2025-05-02 ASSESSMENT — PAIN - FUNCTIONAL ASSESSMENT: PAIN_FUNCTIONAL_ASSESSMENT: 0-10

## 2025-05-02 ASSESSMENT — PAIN DESCRIPTION - DESCRIPTORS: DESCRIPTORS: ACHING;DULL

## 2025-05-02 ASSESSMENT — PAIN SCALES - GENERAL
PAINLEVEL_OUTOF10: 0
PAINLEVEL_OUTOF10: 0

## 2025-05-02 NOTE — H&P
[facility]  [address] [place] [address], [address] [address]  Tel:  [phone]  Fax: [phone]    Patient: Ingrid Lopez   YOB: 1950   Birth Sex: Female      Date: 05/01/2025 04:00 PM   Historian: self   Visit Type: Office Visit       Completed Orders (This Visit)  Order Details Reason Side Interpretation Result Additional Info Initial Treatment Date Region   Weight monitoring           Dietary management education, guidance, and counseling             Assessment/Plan  # Detail Type Description    1. Assessment Small intestinal bacterial overgrowth (SIBO) (K63.8219).    Patient Plan 4/3/25 Seen for f/u of ER visit 3/25. She went to the ER for worsening epigastric pain with associated gas bloat. Patient has a history of Ynes-en-Y gastric bypass and significant constipation high pretest probability for SIBO.    5/1/25 SIBO results positive for both hydrogen and methane subtypes. We discussed these results as well as trial of antibiotic treatment followed by dietary treatment. She continues to have gas/bloat, diarrhea, constipation, epigastric pain and early satiety.    Plan:  - Course of rifaximin and neomycin for 2 wks  - SIBO diet, including daily apple cider vinegar  - Currently on PPI but no plans to decrease at this time d/t continued GERD symptoms  - Has f/u scheduled in 2 weeks    MDM:   - 1 or more chronic illnesses with exacerbation, progression   - review external notes  - reviewed test results  - ordered medication    Plan Orders  Active Medication: neomycin 500 mg tablet and Xifaxan 550 mg tablet         2. Assessment GERD w/o esophagitis (K21.9).    Patient Plan Historic note  Ms. Ingrid Lopez is a pleasant 72 year old lady who began experiencing daily nausea and dull epigastric pain, using TUMS every night.    9/23  Since last visit changed from Protonix to Omeprazole 40mg daily.  This relieved all epigastric pain and nausea.  Has not had  high dose seasonal, preservative-free     Encounter submitted for review by Nico Kirby PA-C on 05/02/2025  8:01 AM.  Document generated by: Nico Kirby 05/02/2025      ----------------------------------------------------------------------------------------------------------------------------------------------------------------------  Pt seen and examined.  No interval change

## 2025-05-02 NOTE — ANESTHESIA POSTPROCEDURE EVALUATION
Department of Anesthesiology  Postprocedure Note    Patient: Ingrid Lopez  MRN: [id number]  YOB: 1950  Date of evaluation: 5/2/2025    Procedure Summary       Date: 05/02/25 Room / Location: [place] [address] / [facility] ENDOSCOPY    Anesthesia Start: 1017 Anesthesia Stop: 1031    Procedure: ESOPHAGOGASTRODUODENOSCOPY WITH BIOPSY (Upper GI Region) Diagnosis:       Gastroesophageal reflux disease without esophagitis      (Gastroesophageal reflux disease without esophagitis [K21.9])    Surgeons: Kianna Gallardo MD Responsible Provider: Andrea Gutierrez MD    Anesthesia Type: MAC ASA Status: 2            Anesthesia Type: No value filed.    Jhoan Phase I: Johan Score: 10    Johan Phase II: Johan Score: 10    Anesthesia Post Evaluation    Patient location during evaluation: PACU  Patient participation: complete - patient participated  Level of consciousness: awake and alert  Airway patency: patent  Nausea & Vomiting: no nausea and no vomiting  Cardiovascular status: blood pressure returned to baseline  Respiratory status: acceptable  Hydration status: euvolemic  Pain management: adequate    No notable events documented.

## 2025-05-02 NOTE — ANESTHESIA PRE PROCEDURE
Department of Anesthesiology  Preprocedure Note       Name:  Ingrid Lopez   Age:  74 y.o.  :  1950                                          MRN:  [id number]         Date:  2025      Surgeon: Surgeon(s):  Kianna Gallardo MD    Procedure: Procedure(s):  ESOPHAGOGASTRODUODENOSCOPY WITH BIOPSY    Medications prior to admission:   Prior to Admission medications    Medication Sig Start Date End Date Taking? Authorizing Provider   metFORMIN (GLUCOPHAGE) 500 MG tablet Take 2 tablets by mouth every evening   Yes Provider, MD Beth   NONFORMULARY Take 1 tablet by mouth daily Bariatric Vitamin   Yes Provider, Historical, MD   omeprazole (PRILOSEC) 40 MG delayed release capsule Take 20 mg by mouth daily 23  Yes ProviderBeth MD   amLODIPine (NORVASC) 2.5 MG tablet Take 1 tablet by mouth daily 22  Yes Automatic Reconciliation, Ar   atorvastatin (LIPITOR) 20 MG tablet Take 1 tablet by mouth daily 22  Yes Automatic Reconciliation, Ar   buPROPion (WELLBUTRIN XL) 150 MG extended release tablet Take 1 tablet by mouth 3/14/23  Yes Automatic Reconciliation, Ar   citalopram (CELEXA) 40 MG tablet Take 1 tablet by mouth daily 22  Yes Automatic Reconciliation, Ar   levothyroxine (SYNTHROID) 88 MCG tablet Take 1 tablet by mouth daily 22  Yes Automatic Reconciliation, Ar   losartan (COZAAR) 50 MG tablet Take 1 tablet by mouth daily 7/3/22  Yes Automatic Reconciliation, Ar   rizatriptan (MAXALT) 5 MG tablet Take 1 tablet at onset of migraine/headache, may repeat after 2 hours 23   Queenie Bautista ANP       Current medications:    Current Facility-Administered Medications   Medication Dose Route Frequency Provider Last Rate Last Admin    0.9 % sodium chloride infusion   IntraVENous Continuous Kianna Gallardo  mL/hr at 25 New Bag at 25       Allergies:    Allergies   Allergen Reactions    Sulfa Antibiotics Other (See Comments)     Hives    Codeine Itching and

## 2025-05-02 NOTE — DISCHARGE INSTRUCTIONS
Ingrid Lopez  [id number]  1950    EGD DISCHARGE INSTRUCTIONS    Discomfort:  Sore throat- throat lozenges or warm salt water gargle  redness at IV site- apply warm compress to area; if redness or soreness persist- contact your physician  Gaseous discomfort- walking, belching will help relieve any discomfort  You should not operate a vehicle for 12 hours  You should note engage in an occupation involving machinery or appliances for rest of today  You may note drink alcoholic beverages for at least 12 hours  Avoid making any critical decisions for at least 24 hour  DIET  You may resume your regular diet - however -  remember your colon is empty and a heavy meal will produce gas.   Avoid these foods:  vegetables, fried / greasy foods, carbonated drinks    ACTIVITY  You may resume your normal daily activities   Spend the remainder of the day resting -  avoid any strenuous activity.    CALL M.D.  ANY SIGN OF   Increasing pain, nausea, vomiting  Abdominal distension (swelling)  New increased bleeding (oral or rectal)  Fever (chills)  Pain in chest area  Bloody discharge from nose or mouth  Shortness of breath       Follow-up Instructions:  F/U clinic                  Ingrid Lopez  [id number]  1950        DISCHARGE SUMMARY from Nurse         Sedation: Care Instructions  How can you care for yourself at home?    Sedation is the use of medicine to help you feel relaxed and comfortable during a procedure. The medicine is usually given in a vein (by I.V.). It may be used with numbing medicines.  There are different levels of sedation. They range from being awake but relaxed to being completely unconscious. Which level you have will depend on the procedure and your needs. You will be watched closely by a doctor or nurse during sedation.  Common side effects from sedation include:  Feeling sleepy or tired. (Your doctors and nurses will make sure you aren't too sleepy to go home.)  Feeling dizzy or unsteady.  Follow-up 
Fall

## 2025-05-02 NOTE — PROCEDURES
PROCEDURE NOTE  Date: 5/2/2025   Name: Ingrid Lopez  YOB: 1950      Esophagogastroduodenoscopy Procedure Note    Ingrid Lopez  [id number]      Indications: Epigastric pain    Anesthesia/Sedation: MAC anesthesia Propofol    Assistants: Reyna: Susan Ramires RN  Surgical Assistant: Karl Morrison  Endoscopy Technician: Felipa Stiles CNA    Pre-Procedure Exam:  Airway: clear   Heart: normal S1and S2    Lungs: clear bilateral  Abdomen: soft, nontender, bowel sounds present and normal in all quadrants   Mental Status: awake, alert, and oriented to person, place, and time      Procedure in Detail:  Informed consent was obtained for the procedure, including conscious sedation. Risks of pancreatitis, infection, perforation, hemorrhage, adverse drug reaction, and aspiration were discussed. The patient was placed in the left lateral decubitus position.  Based on the pre-procedure assessment, including review of the patient's medical history, medications, allergies, and review of systems, he had been deemed to be an appropriate candidate for moderate sedation; he was therefore sedated with the medications listed above.  He was monitored continuously with electrocardiogram tracing, pulse oximetry, blood pressure monitoring, and direct observation.      The [id number] gastroscope was inserted into the mouth and advanced under direct vision to third portion of the duodenum.  A careful inspection was made as the gastroscope was withdrawn, including a retroflexed view of the proximal stomach; findings and interventions are described below. Appropriate photodocumentation was obtained.    Findings:   OROPHARYNX: Cords and pyriform recesses normal.   ESOPHAGUS: The esophagus is normal. The proximal, mid, and distal portions are normal. The Z-Line is intact.   STOMACH: normal Ynes-en-y anatomy  JEJUNUM: normal.    Therapies:    none    Specimens: No specimens were collected.           Complications:

## (undated) DEVICE — SYRINGE MEDICAL 3ML CLEAR PLASTIC STANDARD NON CONTROL LUERLOCK TIP DISPOSABLE

## (undated) DEVICE — SYRINGE MED 50ML LUERSLIP TIP

## (undated) DEVICE — CATHETER IV 22GA L1IN BLU POLYUR STR HUB RADPQ PROTCT +

## (undated) DEVICE — Device

## (undated) DEVICE — EJECTOR SALIVA 6 IN FLX CLR

## (undated) DEVICE — ENDO CARRY-ON PROCEDURE KIT INCLUDES ENZYMATIC SPONGE, GAUZE, BIOHAZARD LABEL, TRAY, LUBRICANT, DIRTY SCOPE LABEL, WATER LABEL, TRAY, DRAWSTRING PAD, AND DEFENDO 4-PIECE KIT.: Brand: ENDO CARRY-ON PROCEDURE KIT

## (undated) DEVICE — MOUTHPIECE ENDOSCP L CTRL OPN AND SIDE PORTS DISP

## (undated) DEVICE — KENDALL RADIOLUCENT FOAM MONITORING ELECTRODE RECTANGULAR SHAPE: Brand: KENDALL

## (undated) DEVICE — NEEDLE SYRINGE 18GA L1.5IN RED PLAS HUB S STL BLNT FILL W/O

## (undated) DEVICE — TOURNIQUET PHLEB W1XL18IN BLU FLAT RL AND BND REUSE FOR IV

## (undated) DEVICE — 4-PORT MANIFOLD: Brand: NEPTUNE 2